# Patient Record
Sex: FEMALE | Race: WHITE | URBAN - METROPOLITAN AREA
[De-identification: names, ages, dates, MRNs, and addresses within clinical notes are randomized per-mention and may not be internally consistent; named-entity substitution may affect disease eponyms.]

---

## 2020-08-18 ENCOUNTER — APPOINTMENT (RX ONLY)
Dept: URBAN - METROPOLITAN AREA OTHER 8 | Facility: OTHER | Age: 40
Setting detail: DERMATOLOGY
End: 2020-08-18

## 2020-08-18 VITALS — TEMPERATURE: 97.34 F

## 2020-08-18 DIAGNOSIS — D22 MELANOCYTIC NEVI: ICD-10-CM

## 2020-08-18 DIAGNOSIS — Z71.89 OTHER SPECIFIED COUNSELING: ICD-10-CM

## 2020-08-18 DIAGNOSIS — Z41.9 ENCOUNTER FOR PROCEDURE FOR PURPOSES OTHER THAN REMEDYING HEALTH STATE, UNSPECIFIED: ICD-10-CM

## 2020-08-18 DIAGNOSIS — L71.8 OTHER ROSACEA: ICD-10-CM

## 2020-08-18 DIAGNOSIS — L81.4 OTHER MELANIN HYPERPIGMENTATION: ICD-10-CM

## 2020-08-18 DIAGNOSIS — L56.8 OTHER SPECIFIED ACUTE SKIN CHANGES DUE TO ULTRAVIOLET RADIATION: ICD-10-CM

## 2020-08-18 PROBLEM — D22.61 MELANOCYTIC NEVI OF RIGHT UPPER LIMB, INCLUDING SHOULDER: Status: ACTIVE | Noted: 2020-08-18

## 2020-08-18 PROBLEM — D22.39 MELANOCYTIC NEVI OF OTHER PARTS OF FACE: Status: ACTIVE | Noted: 2020-08-18

## 2020-08-18 PROBLEM — D22.5 MELANOCYTIC NEVI OF TRUNK: Status: ACTIVE | Noted: 2020-08-18

## 2020-08-18 PROBLEM — D22.4 MELANOCYTIC NEVI OF SCALP AND NECK: Status: ACTIVE | Noted: 2020-08-18

## 2020-08-18 PROCEDURE — ? COUNSELING

## 2020-08-18 PROCEDURE — 99203 OFFICE O/P NEW LOW 30 MIN: CPT

## 2020-08-18 PROCEDURE — ? PRESCRIPTION

## 2020-08-18 PROCEDURE — ? OBSERVATION AND MEASURE

## 2020-08-18 PROCEDURE — ? PRODUCT LINE (ANTI-AGING)

## 2020-08-18 PROCEDURE — ? PRODUCT LINE (ELTA MD)

## 2020-08-18 RX ORDER — IVERMECTIN 10 MG/G
CREAM TOPICAL QD
Qty: 1 | Refills: 2 | Status: ERX | COMMUNITY
Start: 2020-08-18

## 2020-08-18 RX ORDER — SULFACETAMIDE SODIUM, SULFUR 98; 48 MG/G; MG/G
LIQUID TOPICAL BID
Qty: 1 | Refills: 2 | Status: ERX | COMMUNITY
Start: 2020-08-18

## 2020-08-18 RX ADMIN — SULFACETAMIDE SODIUM, SULFUR: 98; 48 LIQUID TOPICAL at 00:00

## 2020-08-18 RX ADMIN — IVERMECTIN: 10 CREAM TOPICAL at 00:00

## 2020-08-18 ASSESSMENT — SEVERITY ASSESSMENT OVERALL AMONG ALL PATIENTS
IN YOUR EXPERIENCE, AMONG ALL PATIENTS YOU HAVE SEEN WITH THIS CONDITION, HOW SEVERE IS THIS PATIENT'S CONDITION?: MILD TO MODERATE

## 2020-08-18 ASSESSMENT — LOCATION DETAILED DESCRIPTION DERM
LOCATION DETAILED: RIGHT INFERIOR CENTRAL MALAR CHEEK
LOCATION DETAILED: LEFT SUPERIOR CENTRAL MALAR CHEEK
LOCATION DETAILED: EPIGASTRIC SKIN
LOCATION DETAILED: STERNAL NOTCH
LOCATION DETAILED: LEFT INFERIOR CENTRAL MALAR CHEEK
LOCATION DETAILED: RIGHT NASAL SIDEWALL
LOCATION DETAILED: RIGHT SUPERIOR MEDIAL UPPER BACK
LOCATION DETAILED: RIGHT ANTECUBITAL SKIN
LOCATION DETAILED: LEFT SUPERIOR UPPER BACK
LOCATION DETAILED: MID TRAPEZIAL NECK
LOCATION DETAILED: INFERIOR THORACIC SPINE
LOCATION DETAILED: RIGHT POSTERIOR SHOULDER
LOCATION DETAILED: RIGHT SUPERIOR CENTRAL MALAR CHEEK
LOCATION DETAILED: LEFT FOREHEAD

## 2020-08-18 ASSESSMENT — LOCATION SIMPLE DESCRIPTION DERM
LOCATION SIMPLE: LEFT CHEEK
LOCATION SIMPLE: RIGHT CHEEK
LOCATION SIMPLE: RIGHT UPPER BACK
LOCATION SIMPLE: LEFT UPPER BACK
LOCATION SIMPLE: TRAPEZIAL NECK
LOCATION SIMPLE: CHEST
LOCATION SIMPLE: ABDOMEN
LOCATION SIMPLE: LEFT FOREHEAD
LOCATION SIMPLE: UPPER BACK
LOCATION SIMPLE: RIGHT NOSE
LOCATION SIMPLE: RIGHT SHOULDER
LOCATION SIMPLE: RIGHT UPPER ARM

## 2020-08-18 ASSESSMENT — LOCATION ZONE DERM
LOCATION ZONE: TRUNK
LOCATION ZONE: NECK
LOCATION ZONE: NOSE
LOCATION ZONE: FACE
LOCATION ZONE: ARM

## 2020-08-18 NOTE — PROCEDURE: OBSERVATION
Detail Level: Detailed
Size Of Lesion: 0.3 x 0.2 cm
Morphology Per Location (Optional): Dark brown area within a 0.6 cm fleshy pap

## 2020-08-18 NOTE — PROCEDURE: PRODUCT LINE (ANTI-AGING)
Product 74 Price (In Dollars - Numeric Only, No Special Characters Or $): 0.00
Product 75 Units: 0
Product 4 Application Directions: Apply across entire face at bedtime.
Render Product Pricing In Note: No
Product 8 Price (In Dollars - Numeric Only, No Special Characters Or $): 140.00
Name Of Product 5: SkinBetter Interfuse Treatment (Face & Neck)
Name Of Product 3: SkinBetter Instant Effect Eye Gel
Name Of Product 1: SkinBetter Alto Defense Serum
Product 8 Application Directions: Apply across pigmented areas of face twice a day.
Product 5 Price (In Dollars - Numeric Only, No Special Characters Or $): 125.00
Product 3 Price (In Dollars - Numeric Only, No Special Characters Or $): 90.00
Product 1 Price (In Dollars - Numeric Only, No Special Characters Or $): 150.00
Name Of Product 7: SkinBetter Intensive Treatment LINES
Detail Level: Zone
Name Of Product 9: SkinBetter TRIO
Product 1 Units: 1
Product 5 Application Directions: Apply across entire face twice a day.
Product 7 Price (In Dollars - Numeric Only, No Special Characters Or $): 130.00
Product 9 Price (In Dollars - Numeric Only, No Special Characters Or $): 135.00
Product 1 Application Directions: Apply across entire face every morning under sunscreen.
Product 3 Application Directions: Apply to undereye area every morning; may repeat at bedtime if allergies
Allow Plan To Count Towards E/M Coding: No (this will remove associated impression from E/M calculation)
Name Of Product 6: SkinBetter Daily Treatment (Eye Cream)
Name Of Product 4: SkinBetter INTENSIVE Alpharet
Name Of Product 2: Chung Bhagat
Product 9 Application Directions: Apply across face twice a day.
Product 7 Application Directions: Apply to targeted areas prior to all other skincare products - twice a day for 3 months, then decrease to once a day.
Product 6 Price (In Dollars - Numeric Only, No Special Characters Or $): 105.00
Name Of Product 8: SkinBetter EVEN Tone
Product 6 Application Directions: Apply to eyelid skin twice a day.

## 2020-08-18 NOTE — PROCEDURE: PRODUCT LINE (ELTA MD)
Product 3 Price (In Dollars - Numeric Only, No Special Characters Or $): 31.00
Product 27 Units: 0
Product 25 Price (In Dollars - Numeric Only, No Special Characters Or $): 0.00
Product 5 Application Directions: QAM
Product 1 Price (In Dollars - Numeric Only, No Special Characters Or $): 35
Allow Plan To Count Towards E/M Coding: No (this will remove associated impression from E/M calculation)
Name Of Product 6: UV Sport SPF 50
Product 1 Units: 1
Name Of Product 4: UV Physical SPF 41 Tinted
Product 6 Price (In Dollars - Numeric Only, No Special Characters Or $): 25.00
Send Charges To Patient Encounter: No
Name Of Product 2: UV Daily Tinted Broad-Spectrum SPF 40
Product 6 Application Directions: Apply 15 mins prior to sun exposure and reapply every 2 hours.
Name Of Product 7: Broad Spectrum SPF 30+ Pump
Detail Level: Zone
Product 7 Price (In Dollars - Numeric Only, No Special Characters Or $): 35.00
Name Of Product 1: UV Clear Broad-Spectrum SPF 46
Name Of Product 5: UV Shield SPF 45 Oil Free
Name Of Product 3: UV Daily SPF 40 Moisturizing
Product 5 Price (In Dollars - Numeric Only, No Special Characters Or $): 26.00

## 2020-08-20 ENCOUNTER — RX ONLY (OUTPATIENT)
Age: 40
Setting detail: RX ONLY
End: 2020-08-20

## 2020-08-20 RX ORDER — METRONIDAZOLE 10 MG/G
GEL TOPICAL
Qty: 1 | Refills: 2 | Status: ERX | COMMUNITY
Start: 2020-08-20

## 2020-08-20 RX ORDER — AZELAIC ACID 0.15 G/G
GEL TOPICAL
Qty: 1 | Refills: 2 | Status: ERX | COMMUNITY
Start: 2020-08-20

## 2020-08-25 ENCOUNTER — RX ONLY (OUTPATIENT)
Age: 40
Setting detail: RX ONLY
End: 2020-08-25

## 2020-08-25 RX ORDER — METRONIDAZOLE 10 MG/G
GEL TOPICAL
Qty: 1 | Refills: 2 | Status: ERX | COMMUNITY
Start: 2020-08-25

## 2020-08-25 RX ORDER — AZELAIC ACID 0.15 G/G
GEL TOPICAL QAM
Qty: 1 | Refills: 2 | Status: ERX